# Patient Record
Sex: MALE | Race: WHITE | NOT HISPANIC OR LATINO | Employment: FULL TIME | URBAN - METROPOLITAN AREA
[De-identification: names, ages, dates, MRNs, and addresses within clinical notes are randomized per-mention and may not be internally consistent; named-entity substitution may affect disease eponyms.]

---

## 2017-05-30 ENCOUNTER — AMBULATORY - RIVER FALLS (OUTPATIENT)
Dept: FAMILY MEDICINE | Facility: CLINIC | Age: 22
End: 2017-05-30

## 2020-07-08 ENCOUNTER — OFFICE VISIT - RIVER FALLS (OUTPATIENT)
Dept: FAMILY MEDICINE | Facility: CLINIC | Age: 25
End: 2020-07-08

## 2020-07-08 ASSESSMENT — MIFFLIN-ST. JEOR: SCORE: 1958.3

## 2020-07-22 ENCOUNTER — OFFICE VISIT - RIVER FALLS (OUTPATIENT)
Dept: FAMILY MEDICINE | Facility: CLINIC | Age: 25
End: 2020-07-22

## 2020-07-22 ASSESSMENT — MIFFLIN-ST. JEOR: SCORE: 1952.85

## 2021-02-24 ENCOUNTER — OFFICE VISIT - RIVER FALLS (OUTPATIENT)
Dept: FAMILY MEDICINE | Facility: CLINIC | Age: 26
End: 2021-02-24

## 2022-02-12 VITALS
WEIGHT: 225.8 LBS | SYSTOLIC BLOOD PRESSURE: 106 MMHG | HEART RATE: 87 BPM | HEART RATE: 66 BPM | BODY MASS INDEX: 35.44 KG/M2 | SYSTOLIC BLOOD PRESSURE: 106 MMHG | WEIGHT: 227 LBS | BODY MASS INDEX: 35.63 KG/M2 | TEMPERATURE: 97.8 F | HEIGHT: 67 IN | DIASTOLIC BLOOD PRESSURE: 69 MMHG | DIASTOLIC BLOOD PRESSURE: 72 MMHG | HEIGHT: 67 IN | TEMPERATURE: 97.7 F

## 2022-02-15 NOTE — NURSING NOTE
Comprehensive Intake Entered On:  2/24/2021 10:55 AM CST    Performed On:  2/24/2021 10:54 AM CST by Eliana Lopez CMA               Summary   Chief Complaint :   Consent for video visit for cough and now rib/back pain from cough...cough is dry   Eliana Lopez CMA - 2/24/2021 10:54 AM CST   Health Status   Allergies Verified? :   Yes   Medication History Verified? :   Yes   Medical History Verified? :   Yes   Tobacco Use? :   Never smoker   Eliana Lopez CMA - 2/24/2021 10:54 AM CST   Consents   Consent for Immunization Exchange :   Consent Granted   Consent for Immunizations to Providers :   Consent Granted   Eliana Lopez CMA - 2/24/2021 10:54 AM CST   Meds / Allergies   (As Of: 2/24/2021 10:55:43 AM CST)   Allergies (Active)   No Known Medication Allergies  Estimated Onset Date:   Unspecified ; Created By:   Coby Gaona CMA; Reaction Status:   Active ; Category:   Drug ; Substance:   No Known Medication Allergies ; Type:   Allergy ; Updated By:   Coby Gaona CMA; Reviewed Date:   2/24/2021 10:55 AM CST        Medication List   (As Of: 2/24/2021 10:55:43 AM CST)   Prescription/Discharge Order    cyclobenzaprine  :   cyclobenzaprine ; Status:   Processing ; Ordered As Mnemonic:   cyclobenzaprine 10 mg oral tablet ; Ordering Provider:   Xiang Anaya MD; Action Display:   Complete ; Catalog Code:   cyclobenzaprine ; Order Dt/Tm:   2/24/2021 10:55:15 AM CST            Home Meds    ibuprofen  :   ibuprofen ; Status:   Processing ; Ordered As Mnemonic:   ibuprofen 200 mg oral tablet ; Action Display:   Complete ; Catalog Code:   ibuprofen ; Order Dt/Tm:   2/24/2021 10:55:15 AM CST            ID Risk Screen   Recent Travel History :   No recent travel   Family Member Travel History :   No recent travel   Other Exposure to Infectious Disease :   Unknown   COVID-19 Testing Status :   No COVID-19 test performed   Eliana Lopez CMA - 2/24/2021 10:54 AM CST   Social History   Social History   (As  Of: 2/24/2021 10:55:43 AM CST)   Alcohol:        Current, Beer (12 oz), < once per week., 4 drinks/episode maximum.   (Last Updated: 4/19/2016 12:09:17 PM CDT by Coby Gaona CMA)          Tobacco:        Never smoker   (Last Updated: 4/19/2016 12:09:26 PM CDT by Coby Gaona CMA)   Never (less than 100 in lifetime)   (Last Updated: 2/24/2021 10:55:37 AM CST by Eliana Lopez CMA)          Electronic Cigarette/Vaping:        Electronic Cigarette Use: Never.   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   (Last Updated: 4/19/2016 12:08:08 PM CDT by Coby Gaona CMA)          Nutrition/Health:        Type of diet: Regular.   (Last Updated: 4/19/2016 12:10:04 PM CDT by Coby Gaona CMA)          Exercise:  Does not exercise      (Last Updated: 4/19/2016 12:10:12 PM CDT by Coby Gaona CMA )         Sexual:        Sexually active: Yes.  Sexual orientation: Heterosexual.  Uses condoms: Yes.  Contraceptive Use Details: Birth control pill, Condoms.   (Last Updated: 4/19/2016 12:08:32 PM CDT by Coby Gaona CMA)

## 2022-02-15 NOTE — NURSING NOTE
Comprehensive Intake Entered On:  7/22/2020 4:48 PM CDT    Performed On:  7/22/2020 4:47 PM CDT by Umm Hensley               Summary   Chief Complaint :   f/up back injury.  Work comp   Weight Measured :   225.8 lb(Converted to: 225 lb 13 oz, 102.42 kg)    Height Measured :   67 in(Converted to: 5 ft 7 in, 170.18 cm)    Body Mass Index :   35.36 kg/m2 (HI)    Body Surface Area :   2.2 m2   Systolic Blood Pressure :   106 mmHg   Diastolic Blood Pressure :   72 mmHg   Mean Arterial Pressure :   83 mmHg   Peripheral Pulse Rate :   87 bpm   BP Method :   Electronic   HR Method :   Electronic   Temperature Tympanic :   97.7 DegF(Converted to: 36.5 DegC)  (LOW)    Umm Hensley - 7/22/2020 4:47 PM CDT   ID Risk Screen   Recent Travel History :   No recent travel   Family Member Travel History :   No recent travel   Other Exposure to Infectious Disease :   Unknown   Umm Hensley - 7/22/2020 4:47 PM CDT

## 2022-02-15 NOTE — NURSING NOTE
Comprehensive Intake Entered On:  7/8/2020 3:31 PM CDT    Performed On:  7/8/2020 3:27 PM CDT by Esha Blake CMA               Summary   Chief Complaint :   Low back pain/injury at work last Wed   Weight Measured :   227 lb(Converted to: 227 lb 0 oz, 102.97 kg)    Height Measured :   67 in(Converted to: 5 ft 7 in, 170.18 cm)    Body Mass Index :   35.55 kg/m2 (HI)    Body Surface Area :   2.2 m2   Systolic Blood Pressure :   106 mmHg   Diastolic Blood Pressure :   69 mmHg   Mean Arterial Pressure :   81 mmHg   Peripheral Pulse Rate :   66 bpm   BP Site :   Right arm   BP Method :   Electronic   Temperature Tympanic :   97.8 DegF(Converted to: 36.6 DegC)  (LOW)    Pain Present :   Yes actual or suspected pain   Intensity :   8    Primary Pain Location :   Back   Esha Blake CMA - 7/8/2020 3:27 PM CDT   Health Status   Allergies Verified? :   Yes   Medication History Verified? :   Yes   Pre-Visit Planning Status :   Not completed   Tobacco Use? :   Never smoker   Esha Blake CMA - 7/8/2020 3:27 PM CDT   Meds / Allergies   (As Of: 7/8/2020 3:31:32 PM CDT)   Allergies (Active)   No Known Medication Allergies  Estimated Onset Date:   Unspecified ; Created By:   Coby Gaona CMA; Reaction Status:   Active ; Category:   Drug ; Substance:   No Known Medication Allergies ; Type:   Allergy ; Updated By:   Coby Gaona CMA; Reviewed Date:   8/8/2016 2:11 PM CDT        Medication List   (As Of: 7/8/2020 3:31:32 PM CDT)   Prescription/Discharge Order    sertraline  :   sertraline ; Status:   Processing ; Ordered As Mnemonic:   sertraline 100 mg oral tablet ; Ordering Provider:   Libertad Craft; Action Display:   Complete ; Catalog Code:   sertraline ; Order Dt/Tm:   7/8/2020 3:30:44 PM CDT            ID Risk Screen   Recent Travel History :   No recent travel   Family Member Travel History :   No recent travel   Other Exposure to Infectious Disease :   Unknown   Esha Blake CMA - 7/8/2020 3:27 PM CDT

## 2022-02-15 NOTE — PROGRESS NOTES
Patient:   SANTO OAKLEY            MRN: [id number]            FIN: [id number]               Age:   24 years     Sex:  Male     :  1995   Associated Diagnoses:   Low back strain   Author:   Xiang Anaya MD      Visit Information      Date of Service: 2020 03:20 pm  Performing Location: [facility]  Encounter#: [id number]      Chief Complaint   2020 3:27 PM CDT     Low back pain/injury at work last Wed        History of Present Illness   Patient here for back injury.  He notes he is lifting something heavy at work on the second when he felt something pull in his lower back.  Is been sore and tender since with a lot of spasm.  He is tried to go to work since but is been really challenging.  No numbness tingling weakness no pain to his legs no bowel or bladder issues         Review of Systems   Constitutional:  Negative except as documented in history of present illness.    Gastrointestinal:  Negative.    Musculoskeletal:  Negative except as documented in history of present illness.    Integumentary:  Negative.    Neurologic:  Negative except as documented in history of present illness.       Physical Examination   Vital Signs   2020 3:27 PM CDT Temperature Tympanic 97.8 DegF  LOW    Peripheral Pulse Rate 66 bpm    Systolic Blood Pressure 106 mmHg    Diastolic Blood Pressure 69 mmHg    Mean Arterial Pressure 81 mmHg    BP Site Right arm    BP Method Electronic      Measurements from flowsheet : Measurements   2020 3:27 PM CDT Height Measured - Standard 67 in    Weight Measured - Standard 227 lb    BSA 2.2 m2    Body Mass Index 35.55 kg/m2  HI      General:  Alert and oriented, No acute distress.    Musculoskeletal:  He has about 45 degrees of flexion limited extension lateral bending with lower paraspinal muscle tenderness and spasm positive straight leg raise on the left.  CMS otherwise intact for his lower extremities  .    Neurologic:  Alert, Oriented.        Impression and Plan   Diagnosis     Low back strain ([id number] S39.012A).     Course:  Not improving.    Plan:  Will look at work restrictions prednisone taper Flexeril at night recheck in 2 weeks sooner if worse  .    Patient Instructions:       Counseled: Patient, Regarding treatment, Regarding diagnosis, Regarding medications, Activity.

## 2022-02-15 NOTE — PROGRESS NOTES
Patient:   SANTO OAKLEY            MRN: [id number]            FIN: [id number]               Age:   25 years     Sex:  Male     :  1995   Associated Diagnoses:   Allergic bronchitis without complication   Author:   Rogerio Wilcox MD      Impression and Plan   Diagnosis     Allergic bronchitis without complication ([id number] J45.909).     Course:  Worsening.    Orders     Orders   Charges (Evaluation and Management):  [id number] office o/p est low 20-29 min (Charge) (Order): Quantity: 1, Allergic bronchitis without complication.     Orders (Selected)   Prescriptions  Prescribed  benzonatate 200 mg oral capsule: = 1 cap(s) ( 200 mg ), PO, TID, # 21 cap(s), 0 Refill(s), Type: Maintenance, Pharmacy: [facility] #[id number], 1 cap(s) Oral tid,x7 day(s), 67, in, 20 16:47:00 CDT, Height Measured, 225.8, lb, 20 16:47:00 CDT, Weight Measured  fluticasone CFC free 220 mcg/inh inhalation aerosol: ( 440 mcg ), Inhale, bid, # 1 EA, 1 Refill(s), Type: Maintenance, Pharmacy: [facility] #[id number], 440 mcg Inhale bid, 67, in, 20 16:47:00 CDT, Height Measured, 225.8, lb, 20 16:47:00 CDT, Weight Measured.        Visit Information      Date of Service: 2021 10:29 am  Video visit conducted due to the Corona Virus Pandemic.  Start time: 1100  End time: 1115      Primary Care Provider (PCP):  NONE ,    Visit type:  New symptom.    Accompanied by:  No one.    Source of history:  Self.    History limitation:  None.       Chief Complaint   Chief complaint discussed and confirmed correct.     2021 10:54 AM CST   Consent for video visit for cough and now rib/back pain from cough...cough is dry        History of Present Illness             The patient presents with cough.  The cough is described as dry and paroxysmal.  The severity of the cough is moderate.  The cough remains unchanged.  The cough has lasted for 3 week(s).  The context of the cough: occurred occurs every winter.  Patient  has two dogs and one cat so may be due to animal dander..  There are no modifying factors.  Associated symptoms consist of chest pain, denies chills, denies fever, denies nasal congestion, denies rash, denies rhinorrhea, denies shortness of breath and denies sore throat.  Additional pertinent history: none.  non-smoker.  No exposure to airbourne respiratory irritants..        Review of Systems   Constitutional:  Negative.    Eye:  Negative.    Ear/Nose/Mouth/Throat:  Negative.    Respiratory:  Cough, No shortness of breath, No sputum production, No hemoptysis, No wheezing, No cyanosis, No apnea, No sleep apnea, No stridor, Denies orthopnea, No exertional dyspnea, No labored respirations, No pleuritic pain, Denies snoring.    Cardiovascular:  Negative.    Gastrointestinal:  Negative.    Genitourinary:  Negative.    Hematology/Lymphatics:  Negative.    Endocrine:  Negative.    Immunologic:  Negative.    Musculoskeletal:  Negative.    Integumentary:  Negative.    Neurologic:  Negative.    Psychiatric:  Negative.    All other systems reviewed and negative      Health Status   Allergies:    Allergic Reactions (Selected)  No Known Medication Allergies   Medications:  (Selected)   Prescriptions  Prescribed  benzonatate 200 mg oral capsule: = 1 cap(s) ( 200 mg ), PO, TID, # 21 cap(s), 0 Refill(s), Type: Maintenance, Pharmacy: [facility] #[id number], 1 cap(s) Oral tid,x7 day(s), 67, in, 07/22/20 16:47:00 CDT, Height Measured, 225.8, lb, 07/22/20 16:47:00 CDT, Weight Measured  fluticasone CFC free 220 mcg/inh inhalation aerosol: ( 440 mcg ), Inhale, bid, # 1 EA, 1 Refill(s), Type: Maintenance, Pharmacy: [facility] #[id number], 440 mcg Inhale bid, 67, in, 07/22/20 16:47:00 CDT, Height Measured, 225.8, lb, 07/22/20 16:47:00 CDT, Weight Measured   Problem list:    All Problems  Obesity / SNOMED CT 9922763459 / Probable  Major depressive disorder, single episode, moderate / SNOMED CT 835620603 / Confirmed  Resolved:  History of chicken pox / SNOMED CT [id number]--[id number]      Histories   Past Medical History:    Resolved  History of chicken pox ([id number]):  Resolved.   Family History:    CA - Lung cancer  Mother (Althea)  Bipolar disorder  Father (Abrahan, )  Hypertension..  Sister  Aneurysm  Father (Abrahan, )     Procedure history:    None (SNOMED CT 480616878).   Social History:        Electronic Cigarette/Vaping Assessment            Electronic Cigarette Use: Never.      Alcohol Assessment            Current, Beer (12 oz), < once per week., 4 drinks/episode maximum.      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      Nutrition and Health Assessment            Type of diet: Regular.      Exercise and Physical Activity Assessment: Does not exercise      Sexual Assessment            Sexually active: Yes.  Sexual orientation: Heterosexual.  Uses condoms: Yes.  Contraceptive Use Details:               Birth control pill, Condoms.        Physical Examination   General:  Alert and oriented, No acute distress.    Respiratory:  Respirations are non-labored.    Integumentary:  Warm, Dry, Pink.    Psychiatric:  Cooperative, Appropriate mood & affect, Normal judgment.

## 2022-11-15 ENCOUNTER — OFFICE VISIT (OUTPATIENT)
Dept: FAMILY MEDICINE | Facility: CLINIC | Age: 27
End: 2022-11-15
Payer: COMMERCIAL

## 2022-11-15 VITALS
OXYGEN SATURATION: 98 % | RESPIRATION RATE: 20 BRPM | HEIGHT: 67 IN | SYSTOLIC BLOOD PRESSURE: 128 MMHG | TEMPERATURE: 97.6 F | DIASTOLIC BLOOD PRESSURE: 83 MMHG | WEIGHT: 229 LBS | HEART RATE: 83 BPM | BODY MASS INDEX: 35.94 KG/M2

## 2022-11-15 DIAGNOSIS — Z13.220 LIPID SCREENING: ICD-10-CM

## 2022-11-15 DIAGNOSIS — Z00.00 ANNUAL PHYSICAL EXAM: Primary | ICD-10-CM

## 2022-11-15 DIAGNOSIS — Z11.59 NEED FOR HEPATITIS C SCREENING TEST: ICD-10-CM

## 2022-11-15 DIAGNOSIS — Z11.4 SCREENING FOR HIV (HUMAN IMMUNODEFICIENCY VIRUS): ICD-10-CM

## 2022-11-15 PROBLEM — F32.1 SINGLE MAJOR DEPRESSIVE EPISODE, MODERATE (H): Status: ACTIVE | Noted: 2022-11-15

## 2022-11-15 PROBLEM — E66.9 OBESITY: Status: ACTIVE | Noted: 2022-11-15

## 2022-11-15 PROCEDURE — 36415 COLL VENOUS BLD VENIPUNCTURE: CPT | Performed by: PHYSICIAN ASSISTANT

## 2022-11-15 PROCEDURE — 87389 HIV-1 AG W/HIV-1&-2 AB AG IA: CPT | Performed by: PHYSICIAN ASSISTANT

## 2022-11-15 PROCEDURE — 99385 PREV VISIT NEW AGE 18-39: CPT | Performed by: PHYSICIAN ASSISTANT

## 2022-11-15 PROCEDURE — 0134A COVID-19,PF,MODERNA BIVALENT: CPT | Performed by: PHYSICIAN ASSISTANT

## 2022-11-15 PROCEDURE — 86803 HEPATITIS C AB TEST: CPT | Performed by: PHYSICIAN ASSISTANT

## 2022-11-15 PROCEDURE — 91313 COVID-19,PF,MODERNA BIVALENT: CPT | Performed by: PHYSICIAN ASSISTANT

## 2022-11-15 RX ORDER — FLUTICASONE PROPIONATE 220 UG/1
AEROSOL, METERED RESPIRATORY (INHALATION)
COMMUNITY
Start: 2021-02-24 | End: 2022-11-15

## 2022-11-15 ASSESSMENT — ENCOUNTER SYMPTOMS
EYE PAIN: 0
SORE THROAT: 0
ARTHRALGIAS: 0
WEAKNESS: 0
CHILLS: 0
ABDOMINAL PAIN: 0
NAUSEA: 0
NERVOUS/ANXIOUS: 0
HEADACHES: 0
FREQUENCY: 0
JOINT SWELLING: 0
DYSURIA: 0
HEMATOCHEZIA: 0
COUGH: 1
DIARRHEA: 0
HEARTBURN: 0
CONSTIPATION: 0
MYALGIAS: 0
PALPITATIONS: 0
PARESTHESIAS: 0
DIZZINESS: 0
HEMATURIA: 0
SHORTNESS OF BREATH: 0
FEVER: 0

## 2022-11-15 NOTE — PROGRESS NOTES
SUBJECTIVE:   CC: Korey is an 27 year old who presents for preventative health visit.       Patient has been advised of split billing requirements and indicates understanding: Yes  27-year-old male parental clinic for annual exam  He had URI symptoms about 3 weeks ago he has a lingering cough that is improved he is using Mucinex at night  He works up as made cookies  He has no other complaints or concerns screening question for sleep apnea he states really is not a concern  He takes no routine medications  He has never had a cholesterol check  He consents to hepatitis C and HIV testing    Healthy Habits:     Getting at least 3 servings of Calcium per day:  NO    Bi-annual eye exam:  Yes    Dental care twice a year:  Yes    Sleep apnea or symptoms of sleep apnea:  Daytime drowsiness    Diet:  Regular (no restrictions)    Frequency of exercise:  None    Taking medications regularly:  Yes    Medication side effects:  Not applicable    PHQ-2 Total Score: 0    Additional concerns today:  No      Today's PHQ-2 Score:   PHQ-2 ( 1999 Pfizer) 11/15/2022   Q1: Little interest or pleasure in doing things 0   Q2: Feeling down, depressed or hopeless 0   PHQ-2 Score 0   Q1: Little interest or pleasure in doing things Not at all   Q2: Feeling down, depressed or hopeless Not at all   PHQ-2 Score 0       Abuse: Current or Past(Physical, Sexual or Emotional)- No  Do you feel safe in your environment? Yes    Have you ever done Advance Care Planning? (For example, a Health Directive, POLST, or a discussion with a medical provider or your loved ones about your wishes): No, advance care planning information given to patient to review.  Patient declined advance care planning discussion at this time.    Social History     Tobacco Use     Smoking status: Never     Smokeless tobacco: Never     Tobacco comments:     some 2nd hand exposure   Substance Use Topics     Alcohol use: No         Alcohol Use 11/15/2022   Prescreen: >3 drinks/day or >7  "drinks/week? No       Last PSA: No results found for: PSA    Reviewed orders with patient. Reviewed health maintenance and updated orders accordingly - Yes  Lab work is in process    Reviewed and updated as needed this visit by clinical staff   Tobacco  Allergies  Meds   Med Hx  Surg Hx  Fam Hx  Soc Hx        Reviewed and updated as needed this visit by Provider                     Review of Systems   Constitutional: Negative for chills and fever.   HENT: Negative for congestion, ear pain, hearing loss and sore throat.    Eyes: Negative for pain and visual disturbance.   Respiratory: Positive for cough. Negative for shortness of breath.    Cardiovascular: Negative for chest pain, palpitations and peripheral edema.   Gastrointestinal: Negative for abdominal pain, constipation, diarrhea, heartburn, hematochezia and nausea.   Genitourinary: Negative for dysuria, frequency, genital sores, hematuria and urgency.   Musculoskeletal: Negative for arthralgias, joint swelling and myalgias.   Skin: Negative for rash.   Neurological: Negative for dizziness, weakness, headaches and paresthesias.   Psychiatric/Behavioral: Negative for mood changes. The patient is not nervous/anxious.          OBJECTIVE:   /83   Pulse 83   Temp 97.6  F (36.4  C)   Resp 20   Ht 1.702 m (5' 7\")   Wt 103.9 kg (229 lb)   SpO2 98%   BMI 35.87 kg/m      Physical Exam  Vitals and nursing note reviewed.   Constitutional:       Appearance: Normal appearance.   HENT:      Head: Normocephalic and atraumatic.      Right Ear: Tympanic membrane normal.      Left Ear: Tympanic membrane normal.      Nose: Nose normal. No congestion or rhinorrhea.      Mouth/Throat:      Mouth: Mucous membranes are moist.   Eyes:      Conjunctiva/sclera: Conjunctivae normal.   Cardiovascular:      Rate and Rhythm: Normal rate and regular rhythm.      Heart sounds: Normal heart sounds.   Pulmonary:      Effort: Pulmonary effort is normal.      Breath sounds: " "Normal breath sounds.   Abdominal:      General: Abdomen is flat. Bowel sounds are normal.      Palpations: There is no mass.      Tenderness: There is no abdominal tenderness.   Musculoskeletal:         General: Normal range of motion.      Cervical back: Normal range of motion and neck supple.      Right lower leg: No edema.      Left lower leg: No edema.   Lymphadenopathy:      Cervical: No cervical adenopathy.   Skin:     General: Skin is warm and dry.   Neurological:      General: No focal deficit present.   Psychiatric:         Mood and Affect: Mood normal.         Behavior: Behavior normal.         Thought Content: Thought content normal.         Judgment: Judgment normal.               ASSESSMENT/PLAN:   (Z00.00) Annual physical exam  (primary encounter diagnosis)  Comment: Return to clinic in 1 year and as needed  Plan:     (Z11.4) Screening for HIV (human immunodeficiency virus)  Comment: Screen today  Plan:     (Z11.59) Need for hepatitis C screening test  Comment: Screen today  Plan:     (Z13.220) Lipid screening  Comment: Screen today  Plan:           COUNSELING:       Estimated body mass index is 35.87 kg/m  as calculated from the following:    Height as of this encounter: 1.702 m (5' 7\").    Weight as of this encounter: 103.9 kg (229 lb).      reports that he has never smoked. He has never used smokeless tobacco.      Counseling Resources:  ATP IV Guidelines  Pooled Cohorts Equation Calculator  FRAX Risk Assessment  ICSI Preventive Guidelines  Dietary Guidelines for Americans, 2010  USDA's MyPlate  ASA Prophylaxis  Lung CA Screening    ROMÁN Membreno  [facility]  "

## 2022-11-16 LAB
HCV AB SERPL QL IA: NONREACTIVE
HIV 1+2 AB+HIV1 P24 AG SERPL QL IA: NONREACTIVE

## 2022-11-23 ENCOUNTER — E-VISIT (OUTPATIENT)
Dept: FAMILY MEDICINE | Facility: CLINIC | Age: 27
End: 2022-11-23
Payer: COMMERCIAL

## 2022-11-23 DIAGNOSIS — R06.02 SHORTNESS OF BREATH: Primary | ICD-10-CM

## 2022-11-23 PROCEDURE — 99207 PR NON-BILLABLE SERV PER CHARTING: CPT | Performed by: PHYSICIAN ASSISTANT

## 2022-11-23 NOTE — PATIENT INSTRUCTIONS
Dear Korey Rebolledo,    We are sorry you are not feeling well. Based on the responses you provided, you may be experiencing a serious health condition that needs immediate in-person attention. It is recommended that you be seen in the emergency room in order to better evaluate your symptoms. Please click here to find the nearest Emergency Room.     ROMÁN Membreno

## 2022-12-24 ENCOUNTER — HEALTH MAINTENANCE LETTER (OUTPATIENT)
Age: 27
End: 2022-12-24

## 2023-10-16 ENCOUNTER — PATIENT OUTREACH (OUTPATIENT)
Dept: CARE COORDINATION | Facility: CLINIC | Age: 28
End: 2023-10-16
Payer: COMMERCIAL

## 2023-10-30 ENCOUNTER — PATIENT OUTREACH (OUTPATIENT)
Dept: CARE COORDINATION | Facility: CLINIC | Age: 28
End: 2023-10-30
Payer: COMMERCIAL

## 2024-01-28 ENCOUNTER — HEALTH MAINTENANCE LETTER (OUTPATIENT)
Age: 29
End: 2024-01-28

## 2024-08-16 ENCOUNTER — ANCILLARY PROCEDURE (OUTPATIENT)
Dept: GENERAL RADIOLOGY | Facility: CLINIC | Age: 29
End: 2024-08-16
Attending: NURSE PRACTITIONER
Payer: COMMERCIAL

## 2024-08-16 ENCOUNTER — OFFICE VISIT (OUTPATIENT)
Dept: FAMILY MEDICINE | Facility: CLINIC | Age: 29
End: 2024-08-16
Payer: COMMERCIAL

## 2024-08-16 VITALS
HEART RATE: 87 BPM | HEIGHT: 67 IN | BODY MASS INDEX: 36.27 KG/M2 | SYSTOLIC BLOOD PRESSURE: 128 MMHG | TEMPERATURE: 98.1 F | DIASTOLIC BLOOD PRESSURE: 82 MMHG | OXYGEN SATURATION: 98 % | RESPIRATION RATE: 20 BRPM | WEIGHT: 231.1 LBS

## 2024-08-16 DIAGNOSIS — J20.9 ACUTE BRONCHITIS, UNSPECIFIED ORGANISM: Primary | ICD-10-CM

## 2024-08-16 DIAGNOSIS — R05.1 ACUTE COUGH: ICD-10-CM

## 2024-08-16 PROCEDURE — 99213 OFFICE O/P EST LOW 20 MIN: CPT | Performed by: NURSE PRACTITIONER

## 2024-08-16 PROCEDURE — G2211 COMPLEX E/M VISIT ADD ON: HCPCS | Performed by: NURSE PRACTITIONER

## 2024-08-16 PROCEDURE — 71046 X-RAY EXAM CHEST 2 VIEWS: CPT | Mod: TC | Performed by: RADIOLOGY

## 2024-08-16 RX ORDER — BENZONATATE 100 MG/1
100 CAPSULE ORAL 3 TIMES DAILY PRN
Qty: 21 CAPSULE | Refills: 0 | Status: SHIPPED | OUTPATIENT
Start: 2024-08-16 | End: 2024-08-23

## 2024-08-16 RX ORDER — PREDNISONE 20 MG/1
40 TABLET ORAL DAILY
Qty: 10 TABLET | Refills: 0 | Status: SHIPPED | OUTPATIENT
Start: 2024-08-16 | End: 2024-08-21

## 2024-08-16 RX ORDER — ALBUTEROL SULFATE 90 UG/1
2 AEROSOL, METERED RESPIRATORY (INHALATION) EVERY 6 HOURS PRN
Qty: 18 G | Refills: 0 | Status: SHIPPED | OUTPATIENT
Start: 2024-08-16

## 2024-08-16 ASSESSMENT — PATIENT HEALTH QUESTIONNAIRE - PHQ9
SUM OF ALL RESPONSES TO PHQ QUESTIONS 1-9: 9
SUM OF ALL RESPONSES TO PHQ QUESTIONS 1-9: 9
10. IF YOU CHECKED OFF ANY PROBLEMS, HOW DIFFICULT HAVE THESE PROBLEMS MADE IT FOR YOU TO DO YOUR WORK, TAKE CARE OF THINGS AT HOME, OR GET ALONG WITH OTHER PEOPLE: SOMEWHAT DIFFICULT

## 2024-08-16 ASSESSMENT — ENCOUNTER SYMPTOMS
COUGH: 1
FEVER: 1

## 2024-08-16 NOTE — PROGRESS NOTES
"  Assessment & Plan     Acute bronchitis, unspecified organism  Recent likely COVID-19 infection onset 2 weeks ago, he did not test but his partner did test positive. Presents with significant cough, worse at night.  CXR on my review is negative for pneumonia or other acute abnormality.  In the past albuterol inhaler has worked well.  Given significant cough, did start on prednisone for short course.  Discussed he should take medicine with food.  Recommend Tessalon Perles for cough.  If symptoms are not improving in the next 3-4 days or if he has new or concerning symptoms I recommend follow-up.  Recommend follow-up if cough does not resolve in the next 2 to 3 weeks.  - albuterol (PROAIR HFA/PROVENTIL HFA/VENTOLIN HFA) 108 (90 Base) MCG/ACT inhaler; Inhale 2 puffs into the lungs every 6 hours as needed for wheezing or cough  - predniSONE (DELTASONE) 20 MG tablet; Take 2 tablets (40 mg) by mouth daily for 5 days  - benzonatate (TESSALON) 100 MG capsule; Take 1 capsule (100 mg) by mouth 3 times daily as needed for cough    Acute cough  Chest x-ray negative for pneumonia.  - XR Chest 2 Views; Future          BMI  Estimated body mass index is 36.2 kg/m  as calculated from the following:    Height as of this encounter: 1.702 m (5' 7\").    Weight as of this encounter: 104.8 kg (231 lb 1.6 oz).             Subjective   Korey is a 29 year old, presenting for the following health issues:  Cough (Cough x 2 weeks) and Fever (Pt had a fever in the beginning of the cough for a couple days which went away)        8/16/2024    10:55 AM   Additional Questions   Roomed by DEBBIE Gomez     Cough X 2 weeks, states is feeling a little better this morning, was coughing a lot last night, nights are bad, relentless coughing  Fever initially X 1 night  Wife was sick after him and she was positive for COVID-19  Reports history of bronchitis with upper respiratory infection.    Sore from coughing, no chest tightness, no chest pain, no " "wheezing.  States everytime he gets a cold it seems to last forever.  Typically albuterol inhaler is helpful.  Denies any history of pneumonia.    He is using cough suppressant syrup twice daily.  Initially was taking Mucinex        History of Present Illness       Reason for visit:  Cough  Symptom onset:  1-2 weeks ago    He eats 0-1 servings of fruits and vegetables daily.He consumes 2 sweetened beverage(s) daily.He exercises with enough effort to increase his heart rate 9 or less minutes per day.  He exercises with enough effort to increase his heart rate 3 or less days per week.   He is taking medications regularly.                 Review of Systems  Constitutional, HEENT, cardiovascular, pulmonary, gi and gu systems are negative, except as otherwise noted.      Objective    /82 (BP Location: Right arm, Patient Position: Sitting, Cuff Size: Adult Large)   Pulse 87   Temp 98.1  F (36.7  C) (Tympanic)   Resp 20   Ht 1.702 m (5' 7\")   Wt 104.8 kg (231 lb 1.6 oz)   SpO2 98%   BMI 36.20 kg/m    Body mass index is 36.2 kg/m .  Physical Exam  Constitutional:       Appearance: He is ill-appearing.   HENT:      Head: Normocephalic and atraumatic.      Right Ear: Tympanic membrane normal.      Left Ear: Tympanic membrane normal.      Nose: Congestion and rhinorrhea present.      Mouth/Throat:      Mouth: Mucous membranes are moist.      Pharynx: Oropharynx is clear.   Eyes:      Pupils: Pupils are equal, round, and reactive to light.   Cardiovascular:      Rate and Rhythm: Normal rate and regular rhythm.   Pulmonary:      Effort: Pulmonary effort is normal. No respiratory distress.      Breath sounds: Normal breath sounds. No wheezing, rhonchi or rales.   Lymphadenopathy:      Cervical: No cervical adenopathy.   Skin:     General: Skin is warm and dry.   Neurological:      Mental Status: He is alert and oriented to person, place, and time.   Psychiatric:         Mood and Affect: Mood normal.         Behavior: " Behavior normal.            CXR - Reviewed and interpreted by me Normal- no infiltrates, effusions, pneumothoraces, cardiomegaly or masses        Signed Electronically by: DARSHAN Ching CNP